# Patient Record
Sex: FEMALE | Race: WHITE | HISPANIC OR LATINO | Employment: PART TIME | URBAN - METROPOLITAN AREA
[De-identification: names, ages, dates, MRNs, and addresses within clinical notes are randomized per-mention and may not be internally consistent; named-entity substitution may affect disease eponyms.]

---

## 2019-02-13 ENCOUNTER — TRANSFERRED RECORDS (OUTPATIENT)
Dept: HEALTH INFORMATION MANAGEMENT | Facility: CLINIC | Age: 49
End: 2019-02-13

## 2019-02-14 ENCOUNTER — APPOINTMENT (OUTPATIENT)
Dept: GENERAL RADIOLOGY | Facility: CLINIC | Age: 49
End: 2019-02-14
Attending: EMERGENCY MEDICINE
Payer: MEDICAID

## 2019-02-14 ENCOUNTER — HOSPITAL ENCOUNTER (EMERGENCY)
Facility: CLINIC | Age: 49
Discharge: HOME | End: 2019-02-14
Attending: EMERGENCY MEDICINE | Admitting: EMERGENCY MEDICINE
Payer: MEDICAID

## 2019-02-14 VITALS
DIASTOLIC BLOOD PRESSURE: 73 MMHG | OXYGEN SATURATION: 96 % | TEMPERATURE: 98 F | SYSTOLIC BLOOD PRESSURE: 129 MMHG | RESPIRATION RATE: 14 BRPM | HEART RATE: 65 BPM

## 2019-02-14 DIAGNOSIS — R07.9 CHEST PAIN, UNSPECIFIED TYPE: ICD-10-CM

## 2019-02-14 DIAGNOSIS — G44.89 OTHER HEADACHE SYNDROME: ICD-10-CM

## 2019-02-14 DIAGNOSIS — K08.89 PAIN, DENTAL: ICD-10-CM

## 2019-02-14 LAB
ANION GAP SERPL CALCULATED.3IONS-SCNC: 8 MMOL/L (ref 3–14)
BASOPHILS # BLD AUTO: 0.1 10E9/L (ref 0–0.2)
BASOPHILS NFR BLD AUTO: 1 %
BUN SERPL-MCNC: 11 MG/DL (ref 7–30)
CALCIUM SERPL-MCNC: 8.3 MG/DL (ref 8.5–10.1)
CHLORIDE SERPL-SCNC: 101 MMOL/L (ref 94–109)
CO2 SERPL-SCNC: 24 MMOL/L (ref 20–32)
CREAT SERPL-MCNC: 0.52 MG/DL (ref 0.52–1.04)
D DIMER PPP FEU-MCNC: <0.3 UG/ML FEU (ref 0–0.5)
DIFFERENTIAL METHOD BLD: NORMAL
EOSINOPHIL # BLD AUTO: 0 10E9/L (ref 0–0.7)
EOSINOPHIL NFR BLD AUTO: 0.6 %
ERYTHROCYTE [DISTWIDTH] IN BLOOD BY AUTOMATED COUNT: 12.8 % (ref 10–15)
GFR SERPL CREATININE-BSD FRML MDRD: >90 ML/MIN/{1.73_M2}
GLUCOSE BLDC GLUCOMTR-MCNC: 198 MG/DL (ref 70–99)
GLUCOSE SERPL-MCNC: 202 MG/DL (ref 70–99)
HCT VFR BLD AUTO: 38.8 % (ref 35–47)
HGB BLD-MCNC: 13.3 G/DL (ref 11.7–15.7)
IMM GRANULOCYTES # BLD: 0 10E9/L (ref 0–0.4)
IMM GRANULOCYTES NFR BLD: 0.4 %
LYMPHOCYTES # BLD AUTO: 2.6 10E9/L (ref 0.8–5.3)
LYMPHOCYTES NFR BLD AUTO: 37.2 %
MCH RBC QN AUTO: 30.9 PG (ref 26.5–33)
MCHC RBC AUTO-ENTMCNC: 34.3 G/DL (ref 31.5–36.5)
MCV RBC AUTO: 90 FL (ref 78–100)
MONOCYTES # BLD AUTO: 0.3 10E9/L (ref 0–1.3)
MONOCYTES NFR BLD AUTO: 4.9 %
NEUTROPHILS # BLD AUTO: 3.9 10E9/L (ref 1.6–8.3)
NEUTROPHILS NFR BLD AUTO: 55.9 %
NRBC # BLD AUTO: 0 10*3/UL
NRBC BLD AUTO-RTO: 0 /100
NT-PROBNP SERPL-MCNC: 23 PG/ML (ref 0–450)
PLATELET # BLD AUTO: 296 10E9/L (ref 150–450)
POTASSIUM SERPL-SCNC: 3.6 MMOL/L (ref 3.4–5.3)
RBC # BLD AUTO: 4.31 10E12/L (ref 3.8–5.2)
SODIUM SERPL-SCNC: 133 MMOL/L (ref 133–144)
TROPONIN I SERPL-MCNC: <0.015 UG/L (ref 0–0.04)
WBC # BLD AUTO: 7 10E9/L (ref 4–11)

## 2019-02-14 PROCEDURE — 85025 COMPLETE CBC W/AUTO DIFF WBC: CPT | Performed by: EMERGENCY MEDICINE

## 2019-02-14 PROCEDURE — 85379 FIBRIN DEGRADATION QUANT: CPT | Performed by: EMERGENCY MEDICINE

## 2019-02-14 PROCEDURE — 25000128 H RX IP 250 OP 636: Performed by: EMERGENCY MEDICINE

## 2019-02-14 PROCEDURE — 93005 ELECTROCARDIOGRAM TRACING: CPT

## 2019-02-14 PROCEDURE — 00000146 ZZHCL STATISTIC GLUCOSE BY METER IP

## 2019-02-14 PROCEDURE — 80048 BASIC METABOLIC PNL TOTAL CA: CPT | Performed by: EMERGENCY MEDICINE

## 2019-02-14 PROCEDURE — 83880 ASSAY OF NATRIURETIC PEPTIDE: CPT | Performed by: EMERGENCY MEDICINE

## 2019-02-14 PROCEDURE — 96374 THER/PROPH/DIAG INJ IV PUSH: CPT

## 2019-02-14 PROCEDURE — 71046 X-RAY EXAM CHEST 2 VIEWS: CPT

## 2019-02-14 PROCEDURE — 84484 ASSAY OF TROPONIN QUANT: CPT | Performed by: EMERGENCY MEDICINE

## 2019-02-14 PROCEDURE — 99285 EMERGENCY DEPT VISIT HI MDM: CPT | Mod: 25

## 2019-02-14 RX ORDER — METOCLOPRAMIDE 10 MG/1
10 TABLET ORAL 4 TIMES DAILY PRN
Qty: 10 TABLET | Refills: 0 | Status: SHIPPED | OUTPATIENT
Start: 2019-02-14

## 2019-02-14 RX ORDER — METOCLOPRAMIDE HYDROCHLORIDE 5 MG/ML
5 INJECTION INTRAMUSCULAR; INTRAVENOUS ONCE
Status: COMPLETED | OUTPATIENT
Start: 2019-02-14 | End: 2019-02-14

## 2019-02-14 RX ORDER — HYDROXYZINE HYDROCHLORIDE 25 MG/1
25-50 TABLET, FILM COATED ORAL
Qty: 10 TABLET | Refills: 0 | Status: SHIPPED | OUTPATIENT
Start: 2019-02-14 | End: 2019-03-16

## 2019-02-14 RX ADMIN — METOCLOPRAMIDE 5 MG: 5 INJECTION, SOLUTION INTRAMUSCULAR; INTRAVENOUS at 15:03

## 2019-02-14 ASSESSMENT — ENCOUNTER SYMPTOMS
SHORTNESS OF BREATH: 1
DIZZINESS: 0
HEADACHES: 1
LIGHT-HEADEDNESS: 0
NAUSEA: 1

## 2019-02-14 NOTE — ED AVS SNAPSHOT
[facility] Emergency Department  [address]  [facility], [place] [address]  Phone:  [phone]  Fax:  [phone]                                    Bre Ramos   MRN: [id number]    Department:  [facility] Emergency Department   Date of Visit:  2/14/2019           After Visit Summary Signature Page    I have received my discharge instructions, and my questions have been answered. I have discussed any challenges I see with this plan with the nurse or doctor.    ..........................................................................................................................................  Patient/Patient Representative Signature      ..........................................................................................................................................  Patient Representative Print Name and Relationship to Patient    ..................................................               ................................................  Date                                   Time    ..........................................................................................................................................  Reviewed by Signature/Title    ...................................................              ..............................................  Date                                               Time          22EPIC Rev 08/18

## 2019-02-14 NOTE — ED PROVIDER NOTES
History     Chief Complaint:  Chest Pain; Shortness of Breath; Dental Pain; and Nausea    HPI   History obtained via interpretor, patient is Kinyarwanda speaking.   Bre Ramos is a 48 year old female with a medical history of diabetes, thyroid cancer, and hyperlipidemia who presents with chest pain, shortness of breath, dental pain, and nausea. The patient reportedly woke up yesterday morning due to dental pain that she's been having for 1 week. When she was walking downstairs, she had an onset of chest pain and associated shortness of breath and nausea. Her chest pain improved later in the day but then became worse. She was seen at [facility] yesterday for her dental pain and was started on amoxicillin. She had no cardiac work up at the time. The patient followed up with her dentist this morning and was going to have a dental extraction on the left side, but they referred her for evaluation of chest pain first.  She has had similar chest pain a long time ago. Chest pain does increase with taking in a deep breath and when moving around. Of note, the patient also has been experiencing a headache located to the back of head and bilateral sides of her head that has been going since the dental pain started. No recent visual changes, lightheadedness, dizziness, leg swelling or any other symptoms at this time. No recent falls or trauma. No personal history of hypertension. Patient's blood sugar normally runs at 120s-130s.  Had thyroidectomy Jan 8.    CARDIAC RISK FACTORS:  Sex:    Female  Tobacco:   Unknown  Hypertension:   No  Hyperlipidemia:  Yes  Diabetes:   Yes  Family History:  No    PE/DVT RISK FACTORS:  Sex:    Female  Hormones:   No  Tobacco:   Unknown  Cancer:   Yes  Travel:   No  Surgery:   Yes  Other immobilization: No  Personal history:  No  Family history:  No     Allergies:  No Known Allergies     Medications:    Amoxicillin  Patient is uncertain of the names that she takes for her medical  conditions.    Past Medical History:    Thyroid cancer  Diabetes     Past Surgical History:    Thyroidectomy    Family History:    History reviewed. No pertinent family history.      Social History:  Smoking status: None  Alcohol use: None   PCP: [facility]   Presents to the ED with family.    Marital Status:  Single [1]     Review of Systems   HENT: Positive for dental problem.    Eyes: Negative for visual disturbance.   Respiratory: Positive for shortness of breath.    Cardiovascular: Positive for chest pain. Negative for leg swelling.   Gastrointestinal: Positive for nausea.   Neurological: Positive for headaches. Negative for dizziness and light-headedness.   All other systems reviewed and are negative.    Physical Exam   Patient Vitals for the past 24 hrs:   BP Temp Temp src Pulse Heart Rate Resp SpO2   02/14/19 1415 135/83 -- -- 60 58 13 97 %   02/14/19 1400 143/88 -- -- 64 63 13 98 %   02/14/19 1330 127/78 -- -- 61 -- -- --   02/14/19 1315 129/81 -- -- 63 -- -- 97 %   02/14/19 1300 152/70 -- -- 65 -- -- 98 %   02/14/19 1257 152/70 98  F (36.7  C) Oral 65 65 20 98 %      Physical Exam  Eyes:  Sclera white; Pupils are equal and round  ENT:    External ears and nares normal, post-op incision healing well.  No facial swelling.  No palpable abscess along gums.  CV:  Rate as above with regular rhythm     No CW tenderness, no rash    No BLE edema  Resp:  Breath sounds clear and equal bilaterally  GI:  Abdomen is soft, non-tender  MS:  Moves all extremities  Skin:  Warm and dry  Neuro:  Speech is normal and fluent. Alert.        Emergency Department Course   ECG (12:55:58)  Normal sinus rhythm. Normal ECG.    Agree with computer interpretation.   Interpreted at 1300 by Maria Isabel Galeano MD.   Rate 64 bpm. AR interval 156. QRS duration 86. QT/QTc 456/470. P-R-T axes 40 66 58.     Imaging:  Radiographic findings were communicated with the patient and family who voiced understanding of the  findings.  XR Chest 2 Views  No radiographic evidence of acute chest abnormality.   As read by Radiology.     Laboratory:  CBC: WBC 7.0, HGB 13.3,    BMP: Glucose 202 (H), Calcium 8.3 (L) WNL (Creatinine 0.52)   Troponin (1301): <0.015   BNP: 23  Glucose (1312): 198 (H)  D-dimer: <0.3    Interventions:  Reglan 5 mg IV     Emergency Department Course:  1255: ECG obtained, findings as noted above.    1301: Peripheral IV established. Blood drawn for basic laboratory. Troponin obtained. Results as noted above.    The patient was sent for a chest XR while in the emergency department, findings above.   Past medical records, nursing notes, and vitals reviewed.  1355: I performed an exam of the patient and obtained history, as documented above.    Patient was given the above interventions while here in the emergency department.   I rechecked the patient. Findings and plan explained to the Patient. Patient discharged home with instructions regarding supportive care, medications, and reasons to return. The importance of close follow-up was reviewed.      Impression & Plan    Medical Decision Making:  Bre Ramos is a 48 year old female who presents for evaluation of multiple symptoms. Regarding her dental pain, she has been appropriately put on amoxicillin, trialed pain medicines and followed up with dentistry. No drainable abscess today.  I encouraged her to return for definitive management. Regarding her chest pain and shortness of breath, EKG was obtained without evidence of dysrhythmia or ischemia. Troponin was checked and was normal. Given the duration of symptoms, a single troponin effectively rules out an acute coronary event. D-dimer returned normal as well. Blood sugars are high and are normal, but there's no evidence of DKA.  I suspect dental infection contributing to higher blood sugars.  Given her multiple risk factors, her coronary artery disease, outpatient stress testing is appropriate and  referral was placed.  Discussed that if dental procedure is to involve general anesthesia, then may need stress test first unless dental procedure becomes emergent instead of urgent.  If doing under local anesthesia, should be appropriate for treatment given urgent nature of managing the acute dental issue.  She should follow up closely with her primary care provider.     Diagnosis:    ICD-10-CM    1. Chest pain, unspecified type R07.9 Echo Stress Echocardiogram   2. Pain, dental K08.89    3. Other headache syndrome G44.89        Disposition:  discharged to home    Discharge Medications:     Medication List      There are no discharge medications for this visit.       Diana Dewitt  2/14/2019   [facility] EMERGENCY DEPARTMENT  I, Diana Dewitt, am serving as a scribe at 1:55 PM on 2/14/2019 to document services personally performed by Maria Isabel Galeano MD based on my observations and the provider's statements to me.       Maria Isabel Galeano MD  02/15/19 [id number]

## 2019-02-14 NOTE — ED TRIAGE NOTES
Patient speaks Chinese, interview done with assistance of  line. Patient reports chest tightness, shortness of breath, nausea and headache. She reports symptoms started yesterday afternoon. She also reports dental pain and 2 cavities that she needs filled. EKG at this time. Recent thyroid surgery Jan 8th.

## 2019-02-14 NOTE — DISCHARGE INSTRUCTIONS
If you take metoprolol, atenolol, or propranolol - do not take these on the day of your stress test

## 2019-02-15 LAB — INTERPRETATION ECG - MUSE: NORMAL

## 2019-02-18 ENCOUNTER — HOSPITAL ENCOUNTER (OUTPATIENT)
Dept: CARDIOLOGY | Facility: CLINIC | Age: 49
Discharge: HOME | End: 2019-02-18
Attending: EMERGENCY MEDICINE | Admitting: EMERGENCY MEDICINE
Payer: MEDICAID

## 2019-02-18 DIAGNOSIS — R07.9 CHEST PAIN, UNSPECIFIED TYPE: ICD-10-CM

## 2019-02-18 PROCEDURE — 93018 CV STRESS TEST I&R ONLY: CPT | Performed by: INTERNAL MEDICINE

## 2019-02-18 PROCEDURE — 40000264 ECHO STRESS ECHOCARDIOGRAM

## 2019-02-18 PROCEDURE — 93321 DOPPLER ECHO F-UP/LMTD STD: CPT | Mod: 26 | Performed by: INTERNAL MEDICINE

## 2019-02-18 PROCEDURE — 93016 CV STRESS TEST SUPVJ ONLY: CPT | Performed by: INTERNAL MEDICINE

## 2019-02-18 PROCEDURE — 25500064 ZZH RX 255 OP 636: Performed by: EMERGENCY MEDICINE

## 2019-02-18 PROCEDURE — 93325 DOPPLER ECHO COLOR FLOW MAPG: CPT | Mod: 26 | Performed by: INTERNAL MEDICINE

## 2019-02-18 PROCEDURE — 93350 STRESS TTE ONLY: CPT | Mod: 26 | Performed by: INTERNAL MEDICINE

## 2019-02-18 RX ADMIN — HUMAN ALBUMIN MICROSPHERES AND PERFLUTREN 4 ML: 10; .22 INJECTION, SOLUTION INTRAVENOUS at 10:42

## 2021-01-17 ENCOUNTER — APPOINTMENT (OUTPATIENT)
Dept: GENERAL RADIOLOGY | Facility: CLINIC | Age: 51
End: 2021-01-17
Attending: EMERGENCY MEDICINE
Payer: OTHER GOVERNMENT

## 2021-01-17 ENCOUNTER — HOSPITAL ENCOUNTER (EMERGENCY)
Facility: CLINIC | Age: 51
Discharge: HOME | End: 2021-01-17
Attending: EMERGENCY MEDICINE | Admitting: EMERGENCY MEDICINE
Payer: OTHER GOVERNMENT

## 2021-01-17 ENCOUNTER — APPOINTMENT (OUTPATIENT)
Dept: CT IMAGING | Facility: CLINIC | Age: 51
End: 2021-01-17
Attending: EMERGENCY MEDICINE
Payer: OTHER GOVERNMENT

## 2021-01-17 VITALS
TEMPERATURE: 96.8 F | BODY MASS INDEX: 27.76 KG/M2 | HEART RATE: 64 BPM | WEIGHT: 172 LBS | OXYGEN SATURATION: 98 % | DIASTOLIC BLOOD PRESSURE: 65 MMHG | SYSTOLIC BLOOD PRESSURE: 115 MMHG | RESPIRATION RATE: 10 BRPM

## 2021-01-17 DIAGNOSIS — M79.10 MYALGIA: ICD-10-CM

## 2021-01-17 DIAGNOSIS — I10 HYPERTENSION, UNSPECIFIED TYPE: ICD-10-CM

## 2021-01-17 DIAGNOSIS — R51.9 NONINTRACTABLE HEADACHE, UNSPECIFIED CHRONICITY PATTERN, UNSPECIFIED HEADACHE TYPE: ICD-10-CM

## 2021-01-17 LAB
ALBUMIN UR-MCNC: NEGATIVE MG/DL
ANION GAP SERPL CALCULATED.3IONS-SCNC: 5 MMOL/L (ref 3–14)
APPEARANCE UR: CLEAR
BASOPHILS # BLD AUTO: 0.1 10E9/L (ref 0–0.2)
BASOPHILS NFR BLD AUTO: 1.2 %
BILIRUB UR QL STRIP: NEGATIVE
BUN SERPL-MCNC: 12 MG/DL (ref 7–30)
CALCIUM SERPL-MCNC: 8.7 MG/DL (ref 8.5–10.1)
CHLORIDE SERPL-SCNC: 104 MMOL/L (ref 94–109)
CO2 SERPL-SCNC: 28 MMOL/L (ref 20–32)
COLOR UR AUTO: NORMAL
CREAT SERPL-MCNC: 0.66 MG/DL (ref 0.52–1.04)
DIFFERENTIAL METHOD BLD: NORMAL
EOSINOPHIL # BLD AUTO: 0.1 10E9/L (ref 0–0.7)
EOSINOPHIL NFR BLD AUTO: 1.5 %
ERYTHROCYTE [DISTWIDTH] IN BLOOD BY AUTOMATED COUNT: 12.2 % (ref 10–15)
FLUAV RNA RESP QL NAA+PROBE: NEGATIVE
FLUBV RNA RESP QL NAA+PROBE: NEGATIVE
GFR SERPL CREATININE-BSD FRML MDRD: >90 ML/MIN/{1.73_M2}
GLUCOSE SERPL-MCNC: 149 MG/DL (ref 70–99)
GLUCOSE UR STRIP-MCNC: NEGATIVE MG/DL
HCT VFR BLD AUTO: 40.6 % (ref 35–47)
HGB BLD-MCNC: 13.7 G/DL (ref 11.7–15.7)
HGB UR QL STRIP: NEGATIVE
IMM GRANULOCYTES # BLD: 0 10E9/L (ref 0–0.4)
IMM GRANULOCYTES NFR BLD: 0.3 %
KETONES UR STRIP-MCNC: NEGATIVE MG/DL
LABORATORY COMMENT REPORT: NORMAL
LACTATE BLD-SCNC: 1.3 MMOL/L (ref 0.7–2)
LEUKOCYTE ESTERASE UR QL STRIP: NEGATIVE
LYMPHOCYTES # BLD AUTO: 2.2 10E9/L (ref 0.8–5.3)
LYMPHOCYTES NFR BLD AUTO: 36.5 %
MCH RBC QN AUTO: 30.5 PG (ref 26.5–33)
MCHC RBC AUTO-ENTMCNC: 33.7 G/DL (ref 31.5–36.5)
MCV RBC AUTO: 90 FL (ref 78–100)
MONOCYTES # BLD AUTO: 0.4 10E9/L (ref 0–1.3)
MONOCYTES NFR BLD AUTO: 6 %
NEUTROPHILS # BLD AUTO: 3.3 10E9/L (ref 1.6–8.3)
NEUTROPHILS NFR BLD AUTO: 54.5 %
NITRATE UR QL: NEGATIVE
NRBC # BLD AUTO: 0 10*3/UL
NRBC BLD AUTO-RTO: 0 /100
PH UR STRIP: 7 PH (ref 5–7)
PLATELET # BLD AUTO: 289 10E9/L (ref 150–450)
POTASSIUM SERPL-SCNC: 3.4 MMOL/L (ref 3.4–5.3)
RBC # BLD AUTO: 4.49 10E12/L (ref 3.8–5.2)
RBC #/AREA URNS AUTO: 0 /HPF (ref 0–2)
RSV RNA SPEC QL NAA+PROBE: NORMAL
SARS-COV-2 RNA RESP QL NAA+PROBE: NEGATIVE
SODIUM SERPL-SCNC: 137 MMOL/L (ref 133–144)
SOURCE: NORMAL
SP GR UR STRIP: 1.01 (ref 1–1.03)
SPECIMEN SOURCE: NORMAL
SQUAMOUS #/AREA URNS AUTO: <1 /HPF (ref 0–1)
T4 FREE SERPL-MCNC: 1.44 NG/DL (ref 0.76–1.46)
TROPONIN I SERPL-MCNC: <0.015 UG/L (ref 0–0.04)
TSH SERPL DL<=0.005 MIU/L-ACNC: 0.15 MU/L (ref 0.4–4)
UROBILINOGEN UR STRIP-MCNC: NORMAL MG/DL (ref 0–2)
WBC # BLD AUTO: 6 10E9/L (ref 4–11)
WBC #/AREA URNS AUTO: <1 /HPF (ref 0–5)

## 2021-01-17 PROCEDURE — 96375 TX/PRO/DX INJ NEW DRUG ADDON: CPT

## 2021-01-17 PROCEDURE — 258N000003 HC RX IP 258 OP 636: Performed by: EMERGENCY MEDICINE

## 2021-01-17 PROCEDURE — 99285 EMERGENCY DEPT VISIT HI MDM: CPT | Mod: 25

## 2021-01-17 PROCEDURE — 96374 THER/PROPH/DIAG INJ IV PUSH: CPT

## 2021-01-17 PROCEDURE — 96361 HYDRATE IV INFUSION ADD-ON: CPT

## 2021-01-17 PROCEDURE — 71045 X-RAY EXAM CHEST 1 VIEW: CPT

## 2021-01-17 PROCEDURE — 80048 BASIC METABOLIC PNL TOTAL CA: CPT | Performed by: EMERGENCY MEDICINE

## 2021-01-17 PROCEDURE — 83605 ASSAY OF LACTIC ACID: CPT | Performed by: EMERGENCY MEDICINE

## 2021-01-17 PROCEDURE — 84484 ASSAY OF TROPONIN QUANT: CPT | Performed by: EMERGENCY MEDICINE

## 2021-01-17 PROCEDURE — 250N000011 HC RX IP 250 OP 636: Performed by: EMERGENCY MEDICINE

## 2021-01-17 PROCEDURE — 85025 COMPLETE CBC W/AUTO DIFF WBC: CPT | Performed by: EMERGENCY MEDICINE

## 2021-01-17 PROCEDURE — 93005 ELECTROCARDIOGRAM TRACING: CPT

## 2021-01-17 PROCEDURE — 84439 ASSAY OF FREE THYROXINE: CPT | Performed by: EMERGENCY MEDICINE

## 2021-01-17 PROCEDURE — 87636 SARSCOV2 & INF A&B AMP PRB: CPT | Performed by: EMERGENCY MEDICINE

## 2021-01-17 PROCEDURE — 250N000013 HC RX MED GY IP 250 OP 250 PS 637: Performed by: EMERGENCY MEDICINE

## 2021-01-17 PROCEDURE — C9803 HOPD COVID-19 SPEC COLLECT: HCPCS

## 2021-01-17 PROCEDURE — 70450 CT HEAD/BRAIN W/O DYE: CPT

## 2021-01-17 PROCEDURE — 84443 ASSAY THYROID STIM HORMONE: CPT | Performed by: EMERGENCY MEDICINE

## 2021-01-17 PROCEDURE — 81001 URINALYSIS AUTO W/SCOPE: CPT | Performed by: EMERGENCY MEDICINE

## 2021-01-17 RX ORDER — ACETAMINOPHEN 500 MG
1000 TABLET ORAL ONCE
Status: COMPLETED | OUTPATIENT
Start: 2021-01-17 | End: 2021-01-17

## 2021-01-17 RX ORDER — DIPHENHYDRAMINE HYDROCHLORIDE 50 MG/ML
25 INJECTION INTRAMUSCULAR; INTRAVENOUS ONCE
Status: COMPLETED | OUTPATIENT
Start: 2021-01-17 | End: 2021-01-17

## 2021-01-17 RX ADMIN — SODIUM CHLORIDE 1000 ML: 9 INJECTION, SOLUTION INTRAVENOUS at 17:27

## 2021-01-17 RX ADMIN — PROCHLORPERAZINE EDISYLATE 10 MG: 5 INJECTION INTRAMUSCULAR; INTRAVENOUS at 17:32

## 2021-01-17 RX ADMIN — ACETAMINOPHEN 1000 MG: 500 TABLET ORAL at 17:25

## 2021-01-17 RX ADMIN — DIPHENHYDRAMINE HYDROCHLORIDE 25 MG: 50 INJECTION, SOLUTION INTRAMUSCULAR; INTRAVENOUS at 17:32

## 2021-01-17 ASSESSMENT — ENCOUNTER SYMPTOMS
HEADACHES: 1
NECK PAIN: 1
NAUSEA: 0
COUGH: 1
VOMITING: 0
ABDOMINAL PAIN: 0
FATIGUE: 1
MYALGIAS: 1

## 2021-01-17 NOTE — ED TRIAGE NOTES
Pt c/o headache, body aches, neck pain, chest pain, cough, trouble breathing.  Tested negative for covid on 1/15.  Pt reports that the pressure in her neck is causing her heaviness in her body.  Blurred vision.  Elevated blood pressure at home of 150/100  Pt concerned that some of this might be caused by changing of her medications

## 2021-01-17 NOTE — ED PROVIDER NOTES
"  History   Chief Complaint:  Hypertension     The history is provided by the patient. The history is limited by a language barrier. A  was used.      History limited due to language barrier and poor historian.     Bre Ramos is a 50 year old female with history of thyroid cancer and status post thyroidectomy and diabetes mellitus who presents with headache and myalgias. History somewhat vague.  The patient reports that she has been experiencing headache, myalgias, neck pain, chest pain, mild cough, and fatigue for the past few days. She took her blood pressure today and it was high so she decided to come in. She is also complaining of some \"shakiness\" in her vision. She did take Tylenol yesterday for the pain. Denies vomiting, blurry vision, focal weakness, paresthesias. Patient received a negative Covid test on 1/15. Patient expresses concerns her medications may be causing this.  Review of records shows recent change in her levothyroxine from 175mcg to 125 mcg on 1/12/21.    Review of Systems   Constitutional: Positive for fatigue.   Eyes: Positive for visual disturbance.   Respiratory: Positive for cough.    Cardiovascular: Positive for chest pain.   Gastrointestinal: Negative for abdominal pain, nausea and vomiting.   Musculoskeletal: Positive for myalgias and neck pain.   Neurological: Positive for headaches.   All other systems reviewed and are negative.    Allergies:  No Known Allergies    Medications:  Glucotrol XL  Trulicity  Synthroid  Desyrel  Lipitor  Metformin    Past Medical History:    Diabetes mellitus  Cancer  Hyperlipidemia   Hypothyroid    Past Surgical History:    Thyroidectomy  Tubal ligation    Family History:    Father- cancer, diabetes mellitus   Mother- liver cancer    Social History:  Presents alone   No tobacco use     Physical Exam     Patient Vitals for the past 24 hrs:   BP Temp Temp src Pulse Resp SpO2 Weight   01/17/21 1800 -- -- -- 66 10 96 % -- "   01/17/21 1740 (!) 148/87 -- -- 76 (!) 6 97 % --   01/17/21 1715 (!) 149/79 -- -- -- -- 95 % --   01/17/21 1635 (!) 154/77 96.8  F (36  C) Temporal 83 20 97 % 78 kg (172 lb)       Physical Exam  General: Well-nourished, nontoxic  Eyes: PERRL, EOMI, no nystagmus.  No scleral icterus or conjunctival injection.  20/25 bilaterally  ENT:  Moist mucus membranes, posterior oropharynx clear without erythema or exudates. TM normal bilaterally  Neck: Supple with full range of motion  Respiratory:  Lungs clear to auscultation bilaterally, no crackles/rubs/wheezes.  Good air movement  CV: Normal rate and rhythm, no murmurs/rubs/gallops  GI:  Abdomen soft and non-distended.  No tenderness, guarding or rebound  Skin: Warm, dry.  No rashes or petechiae  MSK: No peripheral edema or calf tenderness  Neuro: Alert and oriented to person/place/time.  No aphasia/facial droop/dysarthria.  Tongue midline, normal strength at SCM/trapezius/BUE/BLE.  Normal finger to nose. Normal gait.  Negative romberg, sensation intact over face/BUE/BLE  Psychiatric: Normal affect    Emergency Department Course     ECG (17:49:12):  Rate 73 bpm. MO interval 166. QRS duration 88. QT/QTc 434/478. P-R-T axes 56 69 53. Normal sinus rhythm. Normal ECG. Interpreted at 1749 by Janet Limon DO.     Imaging:  Head CT w/o Contrast  No acute process is identified intracranially. Please see above for full details and description.  As read by Radiology.     XR Chest Port 1 View  Negative chest.   As read by Radiology.     Laboratory:  CBC: WBC 6.0, HGB 13.7,   BMP: glucose 149 (H) o/w WNL (Creatinine 0.66)    Lactic acid: 1.3    Troponin I (Collected 1732): <0.015     TSH: 0.15 (L)  Free T4: 1.44  Symptomatic Influenza A/B & SARS-CoV2 Virus PCR Multiplex: All negative    UA: Negative     Emergency Department Course:    Reviewed:  I reviewed nursing notes, vitals, past medical history and care everywhere    Assessments:  1709 I obtained history and  examined the patient as noted above.   1845 I rechecked the patient and explained findings and plan for discharge.    Interventions:  1725: Tylenol 1000 mg PO   1727: NS 1L IV Bolus    1732: Benadryl 25 mg IV   1732: Compazine 10 mg IV     Disposition:  The patient was discharged to home.     Impression & Plan   Covid-19  Bre Ramos was evaluated during a global COVID-19 pandemic, which necessitated consideration that the patient might be at risk for infection with the SARS-CoV-2 virus that causes COVID-19.   Applicable protocols for evaluation were followed during the patient's care.   COVID-19 was considered as part of the patient's evaluation. The plan for testing is:  a test was obtained during this visit.    Medical Decision Making:  Patient is a 50-year-old female presenting with a myriad of complaints predominantly headache, myalgias and concerns about her blood pressure.  She is noted to be hypertensive though this down trended during her time in the ED.  She is without obvious focal neuro deficits.  She is without meningeal signs.  Patient reports she does not frequently get headaches so decision was made to pursue formal CT which is fortunately unremarkable.  I have a low suspicion for subarachnoid hemorrhage or other serious neurologic sequelae.  Her labs are reassuring today.  There is no evidence of endorgan dysfunction.  EKG without focal ischemia or underlying arrhythmia.  Screening troponin negative, clinically doubt ACS.  She is overall not septic appearing.  She was tested for COVID-19 in light of her symptoms but this resulted negative.  Patient reported symptom improvement during her time in the ED.  I did  patient on recommendation to maintain a blood pressure diary over the next week which will assist PCP and potential antihypertensive medications.  I also recommended Tylenol/Motrin for analgesia.  We discussed that her presentation may be secondary to a viral prodrome and to  represent to the ED should symptoms worsen though to plan on close outpatient follow-up.  I discussed with patient I have a lower suspicion that her presentation is secondary to medication adjustments of her thyroid medication and to discuss any concerns regarding this medication with her PCP.  All questions addressed.    Diagnosis:    ICD-10-CM    1. Hypertension, unspecified type  I10 TSH with free T4 reflex     TSH with free T4 reflex     T4 free     T4 free     CANCELED: TSH with free T4 reflex   2. Nonintractable headache, unspecified chronicity pattern, unspecified headache type  R51.9    3. Myalgia  M79.10        Discharge Medications:  Discharge Medication List as of 1/17/2021  7:47 PM          Scribe Disclosure:  I, Viri Dodge, am serving as a scribe at 4:43 PM on 1/17/2021 to document services personally performed by Janet Limon DO based on my observations and the provider's statements to me.             Janet Limon DO  01/17/21 2015

## 2021-01-17 NOTE — LETTER
January 17, 2021      To Whom It May Concern:      Bre Ramos was seen in our Emergency Department today, 01/17/21.  I expect her condition to improve over the next 3-5 days.  She may return to work/school when improved.    Sincerely,        Nidia Alarcon RN

## 2021-01-17 NOTE — Clinical Note
Bre Ramos was seen and treated in our emergency department on 1/17/2021.  She may return to work on 01/19/2021.       If you have any questions or concerns, please don't hesitate to call.      Janet Limon, DO

## 2021-01-18 LAB — INTERPRETATION ECG - MUSE: NORMAL

## 2023-04-16 ENCOUNTER — APPOINTMENT (OUTPATIENT)
Dept: GENERAL RADIOLOGY | Facility: CLINIC | Age: 53
End: 2023-04-16
Attending: EMERGENCY MEDICINE

## 2023-04-16 ENCOUNTER — HOSPITAL ENCOUNTER (EMERGENCY)
Facility: CLINIC | Age: 53
Discharge: HOME | End: 2023-04-16
Attending: EMERGENCY MEDICINE | Admitting: EMERGENCY MEDICINE

## 2023-04-16 VITALS
SYSTOLIC BLOOD PRESSURE: 131 MMHG | RESPIRATION RATE: 17 BRPM | HEART RATE: 75 BPM | TEMPERATURE: 97 F | OXYGEN SATURATION: 96 % | DIASTOLIC BLOOD PRESSURE: 78 MMHG

## 2023-04-16 DIAGNOSIS — R07.89 OTHER CHEST PAIN: ICD-10-CM

## 2023-04-16 DIAGNOSIS — J02.9 PHARYNGITIS, UNSPECIFIED ETIOLOGY: ICD-10-CM

## 2023-04-16 DIAGNOSIS — J20.9 ACUTE BRONCHITIS, UNSPECIFIED ORGANISM: ICD-10-CM

## 2023-04-16 LAB
ALBUMIN SERPL BCG-MCNC: 4.3 G/DL (ref 3.5–5.2)
ALP SERPL-CCNC: 94 U/L (ref 35–104)
ALT SERPL W P-5'-P-CCNC: 66 U/L (ref 10–35)
ANION GAP SERPL CALCULATED.3IONS-SCNC: 14 MMOL/L (ref 7–15)
AST SERPL W P-5'-P-CCNC: 45 U/L (ref 10–35)
BASOPHILS # BLD AUTO: 0.1 10E3/UL (ref 0–0.2)
BASOPHILS NFR BLD AUTO: 1 %
BILIRUB SERPL-MCNC: 0.3 MG/DL
BUN SERPL-MCNC: 10 MG/DL (ref 6–20)
CALCIUM SERPL-MCNC: 8.9 MG/DL (ref 8.6–10)
CHLORIDE SERPL-SCNC: 101 MMOL/L (ref 98–107)
CREAT SERPL-MCNC: 0.43 MG/DL (ref 0.51–0.95)
DEPRECATED HCO3 PLAS-SCNC: 21 MMOL/L (ref 22–29)
EOSINOPHIL # BLD AUTO: 0.1 10E3/UL (ref 0–0.7)
EOSINOPHIL NFR BLD AUTO: 2 %
ERYTHROCYTE [DISTWIDTH] IN BLOOD BY AUTOMATED COUNT: 12.9 % (ref 10–15)
GFR SERPL CREATININE-BSD FRML MDRD: >90 ML/MIN/1.73M2
GLUCOSE SERPL-MCNC: 203 MG/DL (ref 70–99)
GROUP A STREP BY PCR: NOT DETECTED
HCT VFR BLD AUTO: 41.3 % (ref 35–47)
HGB BLD-MCNC: 14.4 G/DL (ref 11.7–15.7)
IMM GRANULOCYTES # BLD: 0 10E3/UL
IMM GRANULOCYTES NFR BLD: 0 %
LIPASE SERPL-CCNC: 28 U/L (ref 13–60)
LYMPHOCYTES # BLD AUTO: 2.1 10E3/UL (ref 0.8–5.3)
LYMPHOCYTES NFR BLD AUTO: 32 %
MCH RBC QN AUTO: 30.5 PG (ref 26.5–33)
MCHC RBC AUTO-ENTMCNC: 34.9 G/DL (ref 31.5–36.5)
MCV RBC AUTO: 88 FL (ref 78–100)
MONOCYTES # BLD AUTO: 0.3 10E3/UL (ref 0–1.3)
MONOCYTES NFR BLD AUTO: 5 %
NEUTROPHILS # BLD AUTO: 3.9 10E3/UL (ref 1.6–8.3)
NEUTROPHILS NFR BLD AUTO: 60 %
NRBC # BLD AUTO: 0 10E3/UL
NRBC BLD AUTO-RTO: 0 /100
PLATELET # BLD AUTO: 296 10E3/UL (ref 150–450)
POTASSIUM SERPL-SCNC: 4 MMOL/L (ref 3.4–5.3)
PROT SERPL-MCNC: 7.6 G/DL (ref 6.4–8.3)
RBC # BLD AUTO: 4.72 10E6/UL (ref 3.8–5.2)
SODIUM SERPL-SCNC: 136 MMOL/L (ref 136–145)
TROPONIN T SERPL HS-MCNC: <6 NG/L
WBC # BLD AUTO: 6.5 10E3/UL (ref 4–11)

## 2023-04-16 PROCEDURE — 85025 COMPLETE CBC W/AUTO DIFF WBC: CPT | Performed by: EMERGENCY MEDICINE

## 2023-04-16 PROCEDURE — 70360 X-RAY EXAM OF NECK: CPT

## 2023-04-16 PROCEDURE — 99285 EMERGENCY DEPT VISIT HI MDM: CPT | Mod: 25

## 2023-04-16 PROCEDURE — 250N000013 HC RX MED GY IP 250 OP 250 PS 637: Performed by: EMERGENCY MEDICINE

## 2023-04-16 PROCEDURE — 36415 COLL VENOUS BLD VENIPUNCTURE: CPT | Performed by: EMERGENCY MEDICINE

## 2023-04-16 PROCEDURE — 84484 ASSAY OF TROPONIN QUANT: CPT | Performed by: EMERGENCY MEDICINE

## 2023-04-16 PROCEDURE — 83690 ASSAY OF LIPASE: CPT | Performed by: EMERGENCY MEDICINE

## 2023-04-16 PROCEDURE — 71046 X-RAY EXAM CHEST 2 VIEWS: CPT

## 2023-04-16 PROCEDURE — 250N000009 HC RX 250: Performed by: EMERGENCY MEDICINE

## 2023-04-16 PROCEDURE — 80053 COMPREHEN METABOLIC PANEL: CPT | Performed by: EMERGENCY MEDICINE

## 2023-04-16 PROCEDURE — 87651 STREP A DNA AMP PROBE: CPT | Performed by: EMERGENCY MEDICINE

## 2023-04-16 RX ORDER — BENZONATATE 100 MG/1
100 CAPSULE ORAL 3 TIMES DAILY PRN
Qty: 15 CAPSULE | Refills: 0 | Status: SHIPPED | OUTPATIENT
Start: 2023-04-16

## 2023-04-16 RX ADMIN — ALUMINUM HYDROXIDE, MAGNESIUM HYDROXIDE, AND DIMETHICONE: 200; 20; 200 SUSPENSION ORAL at 12:04

## 2023-04-16 ASSESSMENT — ACTIVITIES OF DAILY LIVING (ADL): ADLS_ACUITY_SCORE: 35

## 2023-04-16 ASSESSMENT — ENCOUNTER SYMPTOMS
SHORTNESS OF BREATH: 1
COUGH: 1
VOMITING: 0
DIARRHEA: 0
SORE THROAT: 1

## 2023-04-16 NOTE — ED TRIAGE NOTES
Cough x 2 weeks. Daughter reports pt chokes on food and water recently. C/o some SOB and chest pain. Denies fever. VSS. ABCs intact. A/Ox4.

## 2023-04-16 NOTE — ED PROVIDER NOTES
History     Chief Complaint:  Cough       The history is limited by a language barrier. A  was used.      Bre Ramos is a 53 year old female with a history of GERD and type II diabetes mellitus who presents with cough. The patient reports that she has had a sore throat for about two weeks, and began having chest pain three days ago that exacerbates with deep breathing. She feels that she has a lot of phlegm, and has been feeling slightly short of breath with a cough. Developed bilateral ear pain last night. No vomiting or diarrhea. No recent trauma to the chest or recent travel. Non smoker. Not taking birth control. She thinks that her blood sugars have been slightly increased. History of thyroidectomy.     Independent Historian:   None - Patient Only    Review of External Notes: Previous outpatient records reviewed    ROS:  Review of Systems   HENT: Positive for ear pain and sore throat.    Respiratory: Positive for cough and shortness of breath.    Cardiovascular: Positive for chest pain.   Gastrointestinal: Negative for diarrhea and vomiting.   All other systems reviewed and are negative.      Allergies:  No known drug allergies     Medications:    Trulicity   Metformin  Euthyrox  Rosuvastatin  Fluconazole  Glipizide    Past Medical History:    Type II diabetes mellitus   Hyperlipidemia   Hepatic steatosis   Laryngopharyngeal reflux  BPPV  Papillary thyroid carcinoma  Mood disorder with major depressive-like episode    Past Surgical History:    Thyroidectomy   Tubal ligation    Family History:    Father- lung cancer, heart problems  Mother- liver cancer  Brother- hypertension  Sister- diabetes    Social History:  The patient presents here via private vehicle  Daughter at bedside  Luxembourgish speaking    Physical Exam     Patient Vitals for the past 24 hrs:   BP Temp Temp src Pulse Resp SpO2   04/16/23 1353 131/78 -- -- 75 17 96 %   04/16/23 1330 -- -- -- 71 21 96 %   04/16/23 1215 --  -- -- 77 18 95 %   04/16/23 1200 (!) 150/79 -- -- 70 -- 95 %   04/16/23 1145 (!) 173/87 -- -- 93 -- 97 %   04/16/23 1130 (!) 148/76 -- -- 76 -- 95 %   04/16/23 1101 (!) 162/85 97  F (36.1  C) Temporal 83 20 100 %       Physical Exam  General: The patient is alert, in no respiratory distress.    HENT: Mucous membranes moist.    Cardiovascular: Regular rate and rhythm. Good pulses in all four extremities. Normal capillary refill and skin turgor.     Respiratory: Lungs are clear. No nasal flaring. No retractions. No wheezing, no crackles.    Gastrointestinal: Abdomen soft. No guarding, no rebound. No palpable hernias.     Musculoskeletal: No gross deformity.     Skin: No rashes or petechiae.     Neurologic: The patient is alert and oriented x3. GCS 15. No testable cranial nerve deficit. Follows commands with clear and appropriate speech. Gives appropriate answers. Good strength in all extremities. No gross neurologic deficit. Gross sensation intact. Pupils are round and reactive. No meningismus.     Lymphatic: No cervical adenopathy. No lower extremity swelling.    Psychiatric: The patient is non-tearful.    Emergency Department Course     Imaging:  XR Chest 2 Views   Final Result   IMPRESSION: Negative chest. Lungs clear.      Neck soft tissue XR   Final Result   IMPRESSION: No prevertebral edema. Normal appearance of the epiglottis and larynx. No airway compromise. Numerous metallic clips project over the lower neck. No definite evidence for radiopaque foreign body in the aerodigestive tract.               Report per radiology    Laboratory:  Labs Ordered and Resulted from Time of ED Arrival to Time of ED Departure   COMPREHENSIVE METABOLIC PANEL - Abnormal       Result Value    Sodium 136      Potassium 4.0      Chloride 101      Carbon Dioxide (CO2) 21 (*)     Anion Gap 14      Urea Nitrogen 10.0      Creatinine 0.43 (*)     Calcium 8.9      Glucose 203 (*)     Alkaline Phosphatase 94      AST 45 (*)     ALT 66 (*)      Protein Total 7.6      Albumin 4.3      Bilirubin Total 0.3      GFR Estimate >90     TROPONIN T, HIGH SENSITIVITY - Normal    Troponin T, High Sensitivity <6     LIPASE - Normal    Lipase 28     GROUP A STREPTOCOCCUS PCR THROAT SWAB - Normal    Group A strep by PCR Not Detected     CBC WITH PLATELETS AND DIFFERENTIAL    WBC Count 6.5      RBC Count 4.72      Hemoglobin 14.4      Hematocrit 41.3      MCV 88      MCH 30.5      MCHC 34.9      RDW 12.9      Platelet Count 296      % Neutrophils 60      % Lymphocytes 32      % Monocytes 5      % Eosinophils 2      % Basophils 1      % Immature Granulocytes 0      NRBCs per 100 WBC 0      Absolute Neutrophils 3.9      Absolute Lymphocytes 2.1      Absolute Monocytes 0.3      Absolute Eosinophils 0.1      Absolute Basophils 0.1      Absolute Immature Granulocytes 0.0      Absolute NRBCs 0.0          Emergency Department Course & Assessments:     Interventions:  Medications   lidocaine (viscous) (XYLOCAINE) 2 % 15 mL, alum & mag hydroxide-simethicone (MAALOX) 15 mL GI Cocktail ( Oral $Given 4/16/23 1204)      Assessments:  1143 I obtained history via  and examined the patient as noted above.  1318 I rechecked the patient and explained findings.     Independent Interpretation (X-rays, CTs, rhythm strip):  1301 I reviewed the patient's chest x-ray.    Consultations/Discussion of Management or Tests:  None     Social Determinants of Health affecting care:   None and language barrier    Disposition:  The patient was discharged to home.     Impression & Plan      Medical Decision Making:  The patient presented mainly complaining of sore throat she did not have hoarse voice and also complained of dry cough with chest pain.  The patient did voice she was very concerned because she has had a history of thyroid cancer in the past and was worried that with a lump feeling in her throat this could be cancer.  The chest pain though she is diabetic felt like it was less  cardiac in nature I did order a screening EKG and troponin which were both reassuring she has had symptoms for many hours and the fact that they are negative or reassuring.  A chest x-ray did not show signs of infiltrate or pneumothorax.  I did order an x-ray of her neck and while there are clips from her previous thyroid surgery there is nothing that looks like it is in the airway.  At this point I will start her on medication to symptomatically treat her sore throat the strep was negative but there are other viral processes that could be behind this include with her dry cough and URI symptoms.  I stressed the need to follow-up discussed that this does not rule out cancers as a cause of the fact she has pain with it makes it less likely.  The patient does not show cardiac ischemia was discharged home in good condition with likely URI symptoms behind her pain.    Diagnosis:    ICD-10-CM    1. Other chest pain  R07.89       2. Acute bronchitis, unspecified organism  J20.9       3. Pharyngitis, unspecified etiology  J02.9            Discharge Medications:  Discharge Medication List as of 4/16/2023  1:45 PM      START taking these medications    Details   benzonatate (TESSALON) 100 MG capsule Take 1 capsule (100 mg) by mouth 3 times daily as needed for cough, Disp-15 capsule, R-0, Local Print            Scribe Disclosure:  I, Cinda Lozano, am serving as a scribe at 11:42 AM on 4/16/2023 to document services personally performed by Edouard Dawson MD based on my observations and the provider's statements to me.      4/16/2023   Edouard Dawson MD Farnan, Christopher M, MD  04/16/23 [id number]

## 2024-08-15 ENCOUNTER — HOSPITAL ENCOUNTER (EMERGENCY)
Facility: CLINIC | Age: 54
Discharge: HOME | End: 2024-08-16
Attending: EMERGENCY MEDICINE | Admitting: EMERGENCY MEDICINE

## 2024-08-15 VITALS
TEMPERATURE: 97.1 F | RESPIRATION RATE: 20 BRPM | DIASTOLIC BLOOD PRESSURE: 83 MMHG | BODY MASS INDEX: 27.36 KG/M2 | WEIGHT: 169.53 LBS | OXYGEN SATURATION: 98 % | HEART RATE: 79 BPM | SYSTOLIC BLOOD PRESSURE: 160 MMHG

## 2024-08-15 DIAGNOSIS — R10.13 EPIGASTRIC PAIN: ICD-10-CM

## 2024-08-15 LAB
BASOPHILS # BLD AUTO: 0.1 10E3/UL (ref 0–0.2)
BASOPHILS NFR BLD AUTO: 1 %
EOSINOPHIL # BLD AUTO: 0.1 10E3/UL (ref 0–0.7)
EOSINOPHIL NFR BLD AUTO: 1 %
ERYTHROCYTE [DISTWIDTH] IN BLOOD BY AUTOMATED COUNT: 12.9 % (ref 10–15)
HCT VFR BLD AUTO: 45.8 % (ref 35–47)
HGB BLD-MCNC: 16.1 G/DL (ref 11.7–15.7)
IMM GRANULOCYTES # BLD: 0 10E3/UL
IMM GRANULOCYTES NFR BLD: 0 %
LIPASE SERPL-CCNC: 44 U/L (ref 13–60)
LYMPHOCYTES # BLD AUTO: 3.9 10E3/UL (ref 0.8–5.3)
LYMPHOCYTES NFR BLD AUTO: 30 %
MCH RBC QN AUTO: 30.6 PG (ref 26.5–33)
MCHC RBC AUTO-ENTMCNC: 35.2 G/DL (ref 31.5–36.5)
MCV RBC AUTO: 87 FL (ref 78–100)
MONOCYTES # BLD AUTO: 0.5 10E3/UL (ref 0–1.3)
MONOCYTES NFR BLD AUTO: 4 %
NEUTROPHILS # BLD AUTO: 8.3 10E3/UL (ref 1.6–8.3)
NEUTROPHILS NFR BLD AUTO: 64 %
NRBC # BLD AUTO: 0 10E3/UL
NRBC BLD AUTO-RTO: 0 /100
PLATELET # BLD AUTO: 299 10E3/UL (ref 150–450)
RBC # BLD AUTO: 5.26 10E6/UL (ref 3.8–5.2)
WBC # BLD AUTO: 12.9 10E3/UL (ref 4–11)

## 2024-08-15 PROCEDURE — 99285 EMERGENCY DEPT VISIT HI MDM: CPT | Mod: 25

## 2024-08-15 PROCEDURE — 93005 ELECTROCARDIOGRAM TRACING: CPT

## 2024-08-15 PROCEDURE — 83690 ASSAY OF LIPASE: CPT | Performed by: EMERGENCY MEDICINE

## 2024-08-15 PROCEDURE — 85025 COMPLETE CBC W/AUTO DIFF WBC: CPT | Performed by: EMERGENCY MEDICINE

## 2024-08-15 PROCEDURE — 36415 COLL VENOUS BLD VENIPUNCTURE: CPT | Performed by: EMERGENCY MEDICINE

## 2024-08-15 PROCEDURE — 84484 ASSAY OF TROPONIN QUANT: CPT | Performed by: EMERGENCY MEDICINE

## 2024-08-15 PROCEDURE — 80053 COMPREHEN METABOLIC PANEL: CPT | Performed by: EMERGENCY MEDICINE

## 2024-08-15 PROCEDURE — 250N000011 HC RX IP 250 OP 636: Performed by: EMERGENCY MEDICINE

## 2024-08-15 PROCEDURE — 96374 THER/PROPH/DIAG INJ IV PUSH: CPT | Mod: 59

## 2024-08-15 RX ORDER — ONDANSETRON 2 MG/ML
4 INJECTION INTRAMUSCULAR; INTRAVENOUS ONCE
Status: COMPLETED | OUTPATIENT
Start: 2024-08-15 | End: 2024-08-15

## 2024-08-15 RX ADMIN — ONDANSETRON 4 MG: 2 INJECTION INTRAMUSCULAR; INTRAVENOUS at 23:33

## 2024-08-15 ASSESSMENT — COLUMBIA-SUICIDE SEVERITY RATING SCALE - C-SSRS
6. HAVE YOU EVER DONE ANYTHING, STARTED TO DO ANYTHING, OR PREPARED TO DO ANYTHING TO END YOUR LIFE?: NO
1. IN THE PAST MONTH, HAVE YOU WISHED YOU WERE DEAD OR WISHED YOU COULD GO TO SLEEP AND NOT WAKE UP?: NO
2. HAVE YOU ACTUALLY HAD ANY THOUGHTS OF KILLING YOURSELF IN THE PAST MONTH?: NO

## 2024-08-16 ENCOUNTER — APPOINTMENT (OUTPATIENT)
Dept: GENERAL RADIOLOGY | Facility: CLINIC | Age: 54
End: 2024-08-16
Attending: EMERGENCY MEDICINE

## 2024-08-16 ENCOUNTER — APPOINTMENT (OUTPATIENT)
Dept: ULTRASOUND IMAGING | Facility: CLINIC | Age: 54
End: 2024-08-16
Attending: EMERGENCY MEDICINE

## 2024-08-16 LAB
ALBUMIN SERPL BCG-MCNC: 4.7 G/DL (ref 3.5–5.2)
ALBUMIN UR-MCNC: 30 MG/DL
ALP SERPL-CCNC: 135 U/L (ref 40–150)
ALT SERPL W P-5'-P-CCNC: 40 U/L (ref 0–50)
AMORPH CRY #/AREA URNS HPF: ABNORMAL /HPF
ANION GAP SERPL CALCULATED.3IONS-SCNC: 17 MMOL/L (ref 7–15)
APPEARANCE UR: ABNORMAL
AST SERPL W P-5'-P-CCNC: 33 U/L (ref 0–45)
ATRIAL RATE - MUSE: 72 BPM
BILIRUB SERPL-MCNC: 0.4 MG/DL
BILIRUB UR QL STRIP: NEGATIVE
BUN SERPL-MCNC: 8.4 MG/DL (ref 6–20)
CALCIUM SERPL-MCNC: 9.8 MG/DL (ref 8.8–10.4)
CHLORIDE SERPL-SCNC: 97 MMOL/L (ref 98–107)
COLOR UR AUTO: ABNORMAL
CREAT SERPL-MCNC: 0.55 MG/DL (ref 0.51–0.95)
DIASTOLIC BLOOD PRESSURE - MUSE: NORMAL MMHG
EGFRCR SERPLBLD CKD-EPI 2021: >90 ML/MIN/1.73M2
GLUCOSE SERPL-MCNC: 202 MG/DL (ref 70–99)
GLUCOSE UR STRIP-MCNC: NEGATIVE MG/DL
HCO3 SERPL-SCNC: 24 MMOL/L (ref 22–29)
HGB UR QL STRIP: NEGATIVE
HOLD SPECIMEN: NORMAL
HOLD SPECIMEN: NORMAL
INTERPRETATION ECG - MUSE: NORMAL
KETONES UR STRIP-MCNC: 10 MG/DL
LEUKOCYTE ESTERASE UR QL STRIP: NEGATIVE
MUCOUS THREADS #/AREA URNS LPF: PRESENT /LPF
NITRATE UR QL: NEGATIVE
P AXIS - MUSE: 21 DEGREES
PH UR STRIP: 7.5 [PH] (ref 5–7)
POTASSIUM SERPL-SCNC: 3.8 MMOL/L (ref 3.4–5.3)
PR INTERVAL - MUSE: 138 MS
PROT SERPL-MCNC: 8.2 G/DL (ref 6.4–8.3)
QRS DURATION - MUSE: 76 MS
QT - MUSE: 436 MS
QTC - MUSE: 477 MS
R AXIS - MUSE: 52 DEGREES
RBC URINE: 1 /HPF
SODIUM SERPL-SCNC: 138 MMOL/L (ref 135–145)
SP GR UR STRIP: 1.01 (ref 1–1.03)
SQUAMOUS EPITHELIAL: 1 /HPF
SYSTOLIC BLOOD PRESSURE - MUSE: NORMAL MMHG
T AXIS - MUSE: 57 DEGREES
TROPONIN T SERPL HS-MCNC: 6 NG/L
UROBILINOGEN UR STRIP-MCNC: NORMAL MG/DL
VENTRICULAR RATE- MUSE: 72 BPM
WBC URINE: 1 /HPF

## 2024-08-16 PROCEDURE — 250N000011 HC RX IP 250 OP 636: Performed by: EMERGENCY MEDICINE

## 2024-08-16 PROCEDURE — 76705 ECHO EXAM OF ABDOMEN: CPT

## 2024-08-16 PROCEDURE — 250N000013 HC RX MED GY IP 250 OP 250 PS 637: Performed by: EMERGENCY MEDICINE

## 2024-08-16 PROCEDURE — 250N000009 HC RX 250: Performed by: EMERGENCY MEDICINE

## 2024-08-16 PROCEDURE — 71046 X-RAY EXAM CHEST 2 VIEWS: CPT

## 2024-08-16 PROCEDURE — 81001 URINALYSIS AUTO W/SCOPE: CPT | Performed by: EMERGENCY MEDICINE

## 2024-08-16 PROCEDURE — 96375 TX/PRO/DX INJ NEW DRUG ADDON: CPT

## 2024-08-16 RX ORDER — OMEPRAZOLE 40 MG/1
40 CAPSULE, DELAYED RELEASE ORAL DAILY
Qty: 30 CAPSULE | Refills: 0 | Status: SHIPPED | OUTPATIENT
Start: 2024-08-16 | End: 2024-09-15

## 2024-08-16 RX ORDER — MAGNESIUM HYDROXIDE/ALUMINUM HYDROXICE/SIMETHICONE 120; 1200; 1200 MG/30ML; MG/30ML; MG/30ML
15 SUSPENSION ORAL ONCE
Status: COMPLETED | OUTPATIENT
Start: 2024-08-16 | End: 2024-08-16

## 2024-08-16 RX ORDER — LIDOCAINE HYDROCHLORIDE 20 MG/ML
5 SOLUTION OROPHARYNGEAL ONCE
Status: COMPLETED | OUTPATIENT
Start: 2024-08-16 | End: 2024-08-16

## 2024-08-16 RX ORDER — HYDROMORPHONE HYDROCHLORIDE 1 MG/ML
0.5 INJECTION, SOLUTION INTRAMUSCULAR; INTRAVENOUS; SUBCUTANEOUS ONCE
Status: COMPLETED | OUTPATIENT
Start: 2024-08-16 | End: 2024-08-16

## 2024-08-16 RX ADMIN — LIDOCAINE HYDROCHLORIDE 5 ML: 20 SOLUTION ORAL at 00:22

## 2024-08-16 RX ADMIN — HYDROMORPHONE HYDROCHLORIDE 0.5 MG: 1 INJECTION, SOLUTION INTRAMUSCULAR; INTRAVENOUS; SUBCUTANEOUS at 00:22

## 2024-08-16 RX ADMIN — ALUMINUM HYDROXIDE, MAGNESIUM HYDROXIDE, AND SIMETHICONE 15 ML: 1200; 120; 1200 SUSPENSION ORAL at 00:22

## 2024-08-16 ASSESSMENT — ACTIVITIES OF DAILY LIVING (ADL)
ADLS_ACUITY_SCORE: 35
ADLS_ACUITY_SCORE: 35

## 2024-08-16 NOTE — ED TRIAGE NOTES
Epigastric pain started after eating dinner. Nauseated. Cholecystectomy recommended 6-8yrs ago.

## 2024-08-16 NOTE — ED PROVIDER NOTES
Emergency Department Note      History of Present Illness     Chief Complaint   Abdominal Pain      HPI   Bre Ramos is a 54 year old female who presents for evaluation of abdominal pain.  Her pain first began actually in her mid chest rating out to the left shoulder.  This slowly went away and pain migrated down to her epigastric region and has been there since.  This began shortly after eating around 5 PM.  She does not have N/V chest pain currently.  No shortness of breath.  No fevers or chills or cough.  She does not have a history of similar pain.  She was told about 6 to 8 years ago she should have her gallbladder removed but never had the procedure performed.  No trauma to the abdomen.  No urinary changes.  Her daughter is interpreting at the request.    Independent Historian   None    Review of External Notes       Past Medical History     Medical History and Problem List   Past Medical History:   Diagnosis Date    Cancer (H)     Diabetes (H)        Medications   omeprazole (PRILOSEC) 40 MG DR capsule  benzonatate (TESSALON) 100 MG capsule  docusate sodium 100 MG tablet  docusate sodium 100 MG tablet  HYDROcodone-acetaminophen 5-325 MG per tablet  HYDROcodone-acetaminophen 5-325 MG per tablet  LANTUS SOLOSTAR SC  levofloxacin (LEVAQUIN) 750 MG tablet  metoclopramide (REGLAN) 10 MG tablet        Surgical History   Past Surgical History:   Procedure Laterality Date    THYROIDECTOMY  01/08/2019       Physical Exam     Patient Vitals for the past 24 hrs:   BP Temp Pulse Resp SpO2 Weight   08/15/24 2328 (!) 160/83 97.1  F (36.2  C) 79 20 98 % 76.9 kg (169 lb 8.5 oz)     Physical Exam  Constitutional: Well appearing.  HEENT: Atraumatic.  Moist mucous membranes.  Neck: Soft.  Supple.    Cardiac: Regular rate and rhythm.  No murmur or rub.  Respiratory: Clear to auscultation bilaterally.  No respiratory distress.  No wheezing, rhonchi, or rales.  Abdomen: Soft with minimal epigastric tenderness to  palpation.  No rebound or guarding.  Nondistended.  Musculoskeletal: No edema.  Normal range of motion.  Neurologic: Alert and oriented x3.  Normal tone and bulk.    Skin: No rashes.  No edema.  Psych: Normal affect.  Normal behavior.      Diagnostics     Lab Results   Labs Ordered and Resulted from Time of ED Arrival to Time of ED Departure   COMPREHENSIVE METABOLIC PANEL - Abnormal       Result Value    Sodium 138      Potassium 3.8      Carbon Dioxide (CO2) 24      Anion Gap 17 (*)     Urea Nitrogen 8.4      Creatinine 0.55      GFR Estimate >90      Calcium 9.8      Chloride 97 (*)     Glucose 202 (*)     Alkaline Phosphatase 135      AST 33      ALT 40      Protein Total 8.2      Albumin 4.7      Bilirubin Total 0.4     CBC WITH PLATELETS AND DIFFERENTIAL - Abnormal    WBC Count 12.9 (*)     RBC Count 5.26 (*)     Hemoglobin 16.1 (*)     Hematocrit 45.8      MCV 87      MCH 30.6      MCHC 35.2      RDW 12.9      Platelet Count 299      % Neutrophils 64      % Lymphocytes 30      % Monocytes 4      % Eosinophils 1      % Basophils 1      % Immature Granulocytes 0      NRBCs per 100 WBC 0      Absolute Neutrophils 8.3      Absolute Lymphocytes 3.9      Absolute Monocytes 0.5      Absolute Eosinophils 0.1      Absolute Basophils 0.1      Absolute Immature Granulocytes 0.0      Absolute NRBCs 0.0     ROUTINE UA WITH MICROSCOPIC REFLEX TO CULTURE - Abnormal    Color Urine Light Yellow      Appearance Urine Slightly Cloudy (*)     Glucose Urine Negative      Bilirubin Urine Negative      Ketones Urine 10 (*)     Specific Gravity Urine 1.013      Blood Urine Negative      pH Urine 7.5 (*)     Protein Albumin Urine 30 (*)     Urobilinogen Urine Normal      Nitrite Urine Negative      Leukocyte Esterase Urine Negative      Mucus Urine Present (*)     Amorphous Crystals Urine Few (*)     RBC Urine 1      WBC Urine 1      Squamous Epithelials Urine 1     LIPASE - Normal    Lipase 44     TROPONIN T, HIGH SENSITIVITY -  Normal    Troponin T, High Sensitivity 6         Imaging   XR Chest 2 Views   Final Result   IMPRESSION: Small lung volumes and crowding of pulmonary vasculature. No discrete airspace consolidation. No pleural effusion or pneumothorax.      Cardiomediastinal silhouette is normal.      Bilateral neck surgical clips.      US Abdomen Limited   Final Result   IMPRESSION:   1.  Cholelithiasis.                EKG   ECG results from 08/15/24   EKG 12-lead, tracing only     Value    Systolic Blood Pressure     Diastolic Blood Pressure     Ventricular Rate 72    Atrial Rate 72    KY Interval 138    QRS Duration 76        QTc 477    P Axis 21    R AXIS 52    T Axis 57    Interpretation ECG      Sinus rhythm  Low voltage QRS  Borderline ECG  When compared with ECG of 17-Jan-2021 17:49,  No significant change was found           Independent Interpretation   CXR: No infiltrate.    ED Course      Medications Administered   Medications   ondansetron (ZOFRAN) injection 4 mg (4 mg Intravenous $Given 8/15/24 2333)   alum & mag hydroxide-simethicone (MAALOX) suspension 15 mL (15 mLs Oral $Given 8/16/24 0022)   lidocaine (viscous) (XYLOCAINE) 2 % solution 5 mL (5 mLs Mouth/Throat $Given 8/16/24 0022)   HYDROmorphone (PF) (DILAUDID) injection 0.5 mg (0.5 mg Intravenous $Given 8/16/24 0022)       Procedures   Procedures     Discussion of Management   None    ED Course        Additional Documentation  None    Medical Decision Making / Diagnosis     CMS Diagnoses: None    MIPS       None    MDM   Bre Armando Ramos is a 54 year old female who is afebrile and hemodynamically stable.  Was soft benign on exam.  EKG without ischemic changes.  Troponin within normal limits and has been greater than 6 hours since onset of pain and this excludes ACS.  She has low heart score and can safely follow-up closely with with her primary care physician given atypical chest symptoms and likely demonstrating GERD initially.  Chest x-ray without  acute cardiopulmonary disease.  She has no tachycardia, shortness of breath, or pleuritic chest pain distress PE and I do not PE.  Ultrasound of the right upper quadrant with cholelithiasis without evidence of cholecystitis.  Lab workup as noted as above.  She has minimal leukocytosis of unclear etiology but has normal liver enzymes.  After review interventions, particularly GI cocktail, her pain is completely resolved.  She has no tenderness on exam.  It is unclear if this was GERD versus biliary colic versus other.  Nonetheless, she feels greatly improved would like to go home and I think that is reasonable at this time.  I have given her the surgery clinic and recommend she follow-up closely with general surgery discussed her cholelithiasis and potential biliary colic.  We will start her on omeprazole and we discussed supportive care for GERD.  I recommended she follow close with her primary care physician for all of her symptoms and particularly her GERD.  She is in agreement and feels comfortable plan.  Questions were answered and she was in no distress at time of discharge.    Disposition   The patient was discharged.     Diagnosis     ICD-10-CM    1. Epigastric pain  R10.13            Discharge Medications   Discharge Medication List as of 8/16/2024  2:15 AM        START taking these medications    Details   omeprazole (PRILOSEC) 40 MG DR capsule Take 1 capsule (40 mg) by mouth daily for 30 days, Disp-30 capsule, R-0, E-Prescribe               MD Diogenes Simpson Nicholas J, MD  08/16/24 0326       Colten Shetty MD  08/16/24 0329